# Patient Record
Sex: FEMALE | Race: WHITE | NOT HISPANIC OR LATINO | URBAN - METROPOLITAN AREA
[De-identification: names, ages, dates, MRNs, and addresses within clinical notes are randomized per-mention and may not be internally consistent; named-entity substitution may affect disease eponyms.]

---

## 2021-07-08 ENCOUNTER — EMERGENCY (EMERGENCY)
Facility: HOSPITAL | Age: 31
LOS: 1 days | Discharge: HOME | End: 2021-07-08
Admitting: EMERGENCY MEDICINE
Payer: COMMERCIAL

## 2021-07-08 VITALS
SYSTOLIC BLOOD PRESSURE: 128 MMHG | RESPIRATION RATE: 15 BRPM | HEART RATE: 79 BPM | OXYGEN SATURATION: 97 % | DIASTOLIC BLOOD PRESSURE: 80 MMHG | TEMPERATURE: 98 F

## 2021-07-08 DIAGNOSIS — Z20.822 CONTACT WITH AND (SUSPECTED) EXPOSURE TO COVID-19: ICD-10-CM

## 2021-07-08 PROCEDURE — 99283 EMERGENCY DEPT VISIT LOW MDM: CPT

## 2021-07-08 NOTE — ED PROVIDER NOTE - OBJECTIVE STATEMENT
30 y/o female Pt presents to the ED requesting testing for COVID-19. Asymptomatic at this time with no other acute medical complaints currently. No fever, chills, cough, CP, SOB.

## 2021-07-08 NOTE — ED PROVIDER NOTE - PATIENT PORTAL LINK FT
You can access the FollowMyHealth Patient Portal offered by [facility] by registering at the following website: [email or web address]. By joining [facility]’s FollowMyHealth portal, you will also be able to view your health information using other applications (apps) compatible with our system.

## 2021-07-08 NOTE — ED PROVIDER NOTE - CLINICAL SUMMARY MEDICAL DECISION MAKING FREE TEXT BOX
32 y/o female requesting COVID-19 swab obtained and sent to lab. Pt sitting comfortably in NAD. Will D/C and patient will F/U with results.

## 2021-07-08 NOTE — ED PROVIDER NOTE - NSFOLLOWUPINSTRUCTIONS_ED_ALL_ED_FT
Your test results may take 1-3 days. You will get a text/email.  Please check the patient online portal ([place] and website) for results. You can create a portal account at [email or web address] [place]. If you have old records with [facility] or [facility]  or encounter any difficulties with us you will need to call the HELP line to merge results [phone] (Mon-Fri 8a-[id number]).    Please follow the instructions on provided coronavirus discharge educational forms and if needed self quarantine for 14 days.     If you test positive for COVID 19:    1. STAY HOME for 14 DAYS  2. Minimize human contact to ONLY ESSENTIAL  3. Every time you wash your hands, sing the HAPPY BIRTHDAY song so you know you're washing long enough.  Make sure to scrub the webspace between your fingers.  4. DRINK 1-3 Liters of fluids day x at least 5 days.  To remain hydrated. Your fatigue, lightheadedness, and body aches will decrease and your fever has a better chance of breaking if you are well hydrated.    5. For your Fever and Body aches takes Tylenol 650-100mg every 4-6h (max 4000mg/day). Try not to use ibuprofen, aspirin or naproxen (Advil, Motrin or Aleve) as these may worsen Coronavirus infection.  6. RETURN TO THE ER IMMEDIATELY IF YOU HAVE WORSENING SHORTNESS OF BREATH  7. TAKE THE FOLLOWING SUPPLEMENTS DAILY.        VITAMIN C 1000MG ONCE DAILY.        VITAMIN D 200IU ONCE DAILY.        ZINC 50MG ONCE DAILY.